# Patient Record
(demographics unavailable — no encounter records)

---

## 2024-11-19 NOTE — HISTORY OF PRESENT ILLNESS
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 Started increasing again May or June 2024 Occurs twice a month. Does work double shift as a PCA Has been stressed working full time and going to school  Lack of Sleep and up doing school work     Studying to do  [employer] one year to go  LDL   Palpitation ECHO mild AR, mild conc LVH NORMAL BP  Holter nondiagnostic  Stopped when D/C coffee   EF 65-70%  colon: 6/26/2023  F/U 2033  NORMAL mamm 2/3/2023  BIRAD -1 Tdap 12/20/2021 flu

## 2024-11-19 NOTE — HISTORY OF PRESENT ILLNESS
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 Started increasing again May or June 2024 Occurs twice a month. 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

## 2024-11-19 NOTE — ASSESSMENT
[FreeTextEntry1] : 1) LVH without HTN Investigate why conc LVH with normal BP R/O DEION Sleep Study DEION causing PVC arrythmia and HTN  2) Palpitations. Revisit Dr Rodriguez possible Holter 3) LVH and mild Aortic Regurgitation  F/U ECHO ECG NSR no LVH as seen on echo

## 2024-11-19 NOTE — REVIEW OF SYSTEMS
[Fever] : no fever [Fatigue] : no fatigue [Hot Flashes] : no hot flashes [Chest Pain] : no chest pain [Palpitations] : palpitations [Lower Ext Edema] : no lower extremity edema [Orthopnea] : no orthopnea [Paroxysmal Nocturnal Dyspnea] : no paroxysmal nocturnal dyspnea [Shortness Of Breath] : no shortness of breath [Wheezing] : no wheezing [Cough] : no cough [Dyspnea on Exertion] : no dyspnea on exertion [Skin Rash] : no skin rash [Headache] : no headache [Easy Bleeding] : no easy bleeding [Easy Bruising] : no easy bruising [Swollen Glands] : no swollen glands

## 2024-11-19 NOTE — PHYSICAL EXAM
[No Acute Distress] : no acute distress [Well-Appearing] : well-appearing [Normal TMs] : both tympanic membranes were normal [Supple] : supple [No Respiratory Distress] : no respiratory distress  [No Accessory Muscle Use] : no accessory muscle use [Clear to Auscultation] : lungs were clear to auscultation bilaterally [Normal Rate] : normal rate  [Regular Rhythm] : with a regular rhythm [Normal S1, S2] : normal S1 and S2 [No Edema] : there was no peripheral edema [Non-distended] : non-distended [No Masses] : no abdominal mass palpated [No HSM] : no HSM [Normal Bowel Sounds] : normal bowel sounds

## 2024-11-19 NOTE — HEALTH RISK ASSESSMENT
[Never] : Never [NO] : No [Patient reported PAP Smear was normal] : Patient reported PAP Smear was normal [High School] : high school [] :  [Fully functional (bathing, dressing, toileting, transferring, walking, feeding)] : Fully functional (bathing, dressing, toileting, transferring, walking, feeding) [Fully functional (using the telephone, shopping, preparing meals, housekeeping, doing laundry, using] : Fully functional and needs no help or supervision to perform IADLs (using the telephone, shopping, preparing meals, housekeeping, doing laundry, using transportation, managing medications and managing finances) [Name: ___] : Health Care Proxy's Name: [unfilled]  [PapSmearDate] : 8/24

## 2024-11-25 NOTE — REASON FOR VISIT
[Other: ____] : [unfilled] [FreeTextEntry1] : November 2024 - Patient returns today for follow-up for the first time in more than three years. She has been noticing occasional palpitations that scare her. The symptoms are brief and self-limited. They do not occur with activity, only at rest. She has not had syncope or presyncope.

## 2024-11-25 NOTE — CARDIOLOGY SUMMARY
[de-identified] : 8/31/2021, normal sinus, 78 bpm [de-identified] : 8/31/2021 [place] x7 days - SVT and PVCs [de-identified] : 9/15/2021 - septum 1.1 cm, PWT 1.1 cm, normal LA, normal pulmonary pressure, normal LV systolic function, LVEF 65-70%

## 2024-11-25 NOTE — PHYSICAL EXAM
[Well Developed] : well developed [Well Nourished] : well nourished [No Acute Distress] : no acute distress [Normal Conjunctiva] : normal conjunctiva [Normal Venous Pressure] : normal venous pressure [No Carotid Bruit] : no carotid bruit [Normal S1, S2] : normal S1, S2 [No Rub] : no rub [No Gallop] : no gallop [I] : a grade 1 [2+] : left 2+ [Right Carotid Bruit] : no bruit heard over the right carotid [Left Carotid Bruit] : no bruit heard over the left carotid [Clear Lung Fields] : clear lung fields [Good Air Entry] : good air entry [No Respiratory Distress] : no respiratory distress  [Soft] : abdomen soft [Non Tender] : non-tender [No Masses/organomegaly] : no masses/organomegaly [Normal Bowel Sounds] : normal bowel sounds [Normal Gait] : normal gait [No Edema] : no edema [No Cyanosis] : no cyanosis [No Clubbing] : no clubbing [No Varicosities] : no varicosities [No Rash] : no rash [No Skin Lesions] : no skin lesions [Moves all extremities] : moves all extremities [No Focal Deficits] : no focal deficits [Normal Speech] : normal speech [Alert and Oriented] : alert and oriented [Normal memory] : normal memory [de-identified] : sons' names on right wrist, her nickname, "Dyan" and her 's name on her left wrist; flower on right chest

## 2024-11-25 NOTE — DISCUSSION/SUMMARY
[FreeTextEntry1] : Patient is a 50 year-old Black woman who has been experiencing palpitations at rest.  Extended Holter to evaluate for arrhythmic etiology. TTE to evaluate for structural heart disease or infiltrative cardiomyopathy.  Patient does not get symptoms while exercising, so there is no need for exercise stress testing at this time. [EKG obtained to assist in diagnosis and management of assessed problem(s)] : EKG obtained to assist in diagnosis and management of assessed problem(s)

## 2024-11-25 NOTE — HISTORY OF PRESENT ILLNESS
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 Patient is a 50 year-old Black woman who has a history of palpitations that she believes are occurring more frequently. They occur day or night, and sometimes they are sustained. They do not occur every day.   She has gained weight during the pandemic. She exercises regularly, including both aerobic and resistance training. She does approximately an hour on the treadmill, mostly walking. She does not get the palpitations while exercising.   No dizziness or light headedness, even with palpitations. No personal history of syncope.  Mother has coronary artery disease status post recent PCI.  She was not sick during the Covid pandemic, although she does report being depressed during it. She has received both doses of Pfizer's Covid-19 vaccine.

## 2024-11-25 NOTE — CARDIOLOGY SUMMARY
[de-identified] : 8/31/2021, normal sinus, 78 bpm [de-identified] : 8/31/2021 [place] x7 days - SVT and PVCs [de-identified] : 9/15/2021 - septum 1.1 cm, PWT 1.1 cm, normal LA, normal pulmonary pressure, normal LV systolic function, LVEF 65-70%

## 2024-11-25 NOTE — HISTORY OF PRESENT ILLNESS
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 They occur day or night, and sometimes they are sustained. They do not occur every day.   She has gained weight during the pandemic. She exercises regularly, including both aerobic and resistance training. She does approximately an hour on the treadmill, mostly walking. She does not get the palpitations while exercising.   No dizziness or light headedness, even with palpitations. No personal history of syncope.  Mother has coronary artery disease status post recent PCI.  She was not sick during the Covid pandemic, although she does report being depressed during it. She has received both doses of Pfizer's Covid-19 vaccine.

## 2024-11-25 NOTE — PHYSICAL EXAM
[Well Developed] : well developed [Well Nourished] : well nourished [No Acute Distress] : no acute distress [Normal Conjunctiva] : normal conjunctiva [Normal Venous Pressure] : normal venous pressure [No Carotid Bruit] : no carotid bruit [Normal S1, S2] : normal S1, S2 [No Rub] : no rub [No Gallop] : no gallop [I] : a grade 1 [2+] : left 2+ [Right Carotid Bruit] : no bruit heard over the right carotid [Left Carotid Bruit] : no bruit heard over the left carotid [Clear Lung Fields] : clear lung fields [Good Air Entry] : good air entry [No Respiratory Distress] : no respiratory distress  [Soft] : abdomen soft [Non Tender] : non-tender [No Masses/organomegaly] : no masses/organomegaly [Normal Bowel Sounds] : normal bowel sounds [Normal Gait] : normal gait [No Edema] : no edema [No Cyanosis] : no cyanosis [No Clubbing] : no clubbing [No Varicosities] : no varicosities [No Rash] : no rash [No Skin Lesions] : no skin lesions [Moves all extremities] : moves all extremities [No Focal Deficits] : no focal deficits [Normal Speech] : normal speech [Alert and Oriented] : alert and oriented [Normal memory] : normal memory [de-identified] : sons' names on right wrist, her nickname, "Dyan" and her 's name on her left wrist; flower on right chest

## 2025-06-27 NOTE — HISTORY OF PRESENT ILLNESS
[de-identified] : This is a very nice 51 year old woman experiencing rt knee pain and some new left knee pain.  She has known right knee patellofemoral syndrome.  We have treated her in the past with Zilretta injections which worked well.  She did have an MRI in the past which did show meniscus tear and chondral fissuring patella.  The pain limits activities of daily living. Walking tolerance is  reduced. The patient denies any radiation of the pain to the feet and it is not associated with numbness, tingling, or weakness.

## 2025-06-27 NOTE — PHYSICAL EXAM
[de-identified] : Patient is well nourished, well-developed, in no acute distress, with appropriate mood and affect. The patient is oriented to time, place, and person. Respirations are even and unlabored. Gait evaluation does not reveal a limp. There is no inguinal adenopathy. The right limb is well-perfused and showed 2+ dp/pt pulses, without skin lesions, shows a grossly normal motor and sensory examination. Right knee motion is painless and the knee moves from 0 to 135 degrees. The knee is stable within that range-of-motion to AP and ML stress with a 1A Lachman, negative anterior or posterior drawer and no instability to varus or valgus stress. The alignment of the knee is 5 degrees valgus. No effusion or crepitus is noted. No tenderness to palpation about the medial or lateral joint line, medial or lateral tibial plateau, medial or lateral femoral condyle, medial or lateral patellar facets, superior or inferior pole of the patella. George's is negative.  Positive patellar grind.  Muscle strength is normal. Pedal pulses are palpable. Hip examination was negative. The left limb is well-perfused and showed 2+ dp/pt pulses, without skin lesions, shows a grossly normal motor and sensory examination. Left knee motion is painless and the knee moves from 0 to 135 degrees. The knee is stable within that range-of-motion to AP and ML stress with a 1A Lachman, negative anterior or posterior drawer and no instability to varus or valgus stress. The alignment of the knee is 5 degrees varus. No effusion or crepitus is noted. No tenderness to palpation about the medial or lateral joint line, medial or lateral tibial plateau, medial or lateral femoral condyle, medial or lateral patellar facets, superior or inferior pole of the patella. George's is negative. Muscle strength is normal. Pedal pulses are palpable. Hip examination was negative. [de-identified] :  Long standing  knee, AP knee, lateral knee, tunnel and patellar views of bilat  knee were ordered for knee pain and taken in the office and do not show any arthritic changes or loss of joint space.

## 2025-06-27 NOTE — DISCUSSION/SUMMARY
[de-identified] : The patient has mild right knee patellofemoral syndrome on MRI and left knee pain likely from compensation. An extensive discussion was conducted on the natural history of the disease and the variety of surgical and non-surgical options available to the patient including, but not limited to non-steroidal anti-inflammatory medications, steroid injections, physical therapy, maintenance of ideal body weight, and reduction of activity. I recommended a prescription of Mobic but the patient would prefer to use OTC NSAIDs at this time.  Weight loss recommended.  Physical therapy recommended but she prefers to home excise program.  The patient will schedule an appointment as needed.  Today we performed a right knee intra-articular cortisone injection.  Additionally I will apply for authorization for Paresh and she will follow-up in 3 months as needed for that injection.  Informed consent for right knee injection  was obtained.  All risks, benefits and alternatives were discussed. These included but were not limited to bleeding, infection and allergic reaction. All questions were answered. A time out was performed. Right  knee were prepped and draped in sterile fashion. Using sterile technique, 40mg of Kenalog, 4cc of 1% lidocaine, using a 21-gauge needle. A sterile dressing was applied. Post injection instructions were reviewed. The patient tolerated the procedure well.